# Patient Record
Sex: FEMALE | Race: ASIAN | NOT HISPANIC OR LATINO | Employment: UNEMPLOYED | URBAN - METROPOLITAN AREA
[De-identification: names, ages, dates, MRNs, and addresses within clinical notes are randomized per-mention and may not be internally consistent; named-entity substitution may affect disease eponyms.]

---

## 2019-05-04 PROBLEM — E11.9 TYPE 2 DIABETES MELLITUS WITHOUT COMPLICATION, WITHOUT LONG-TERM CURRENT USE OF INSULIN (HCC): Status: ACTIVE | Noted: 2019-05-04

## 2019-05-04 PROBLEM — E03.8 OTHER SPECIFIED HYPOTHYROIDISM: Status: ACTIVE | Noted: 2019-05-04

## 2019-07-15 PROBLEM — E78.1 HIGH TRIGLYCERIDES: Status: ACTIVE | Noted: 2019-07-15

## 2019-07-15 PROBLEM — M17.0 BILATERAL PRIMARY OSTEOARTHRITIS OF KNEE: Status: ACTIVE | Noted: 2019-07-15

## 2020-06-25 PROBLEM — R60.0 PEDAL EDEMA: Status: ACTIVE | Noted: 2020-06-25

## 2021-01-13 PROBLEM — M54.42 ACUTE LEFT-SIDED LOW BACK PAIN WITH LEFT-SIDED SCIATICA: Status: ACTIVE | Noted: 2021-01-13

## 2021-01-13 PROBLEM — I10 ESSENTIAL HYPERTENSION: Status: ACTIVE | Noted: 2021-01-13

## 2021-01-13 PROBLEM — M25.561 ACUTE PAIN OF RIGHT KNEE: Status: ACTIVE | Noted: 2021-01-13

## 2021-08-03 ENCOUNTER — HOSPITAL ENCOUNTER (EMERGENCY)
Age: 66
Discharge: HOME | End: 2021-08-03
Attending: EMERGENCY MEDICINE

## 2021-08-03 VITALS
DIASTOLIC BLOOD PRESSURE: 63 MMHG | RESPIRATION RATE: 18 BRPM | TEMPERATURE: 97.8 F | SYSTOLIC BLOOD PRESSURE: 105 MMHG | WEIGHT: 127.65 LBS | OXYGEN SATURATION: 97 % | HEART RATE: 70 BPM

## 2021-08-03 DIAGNOSIS — U07.1 COVID-19 VIRUS INFECTION: Primary | ICD-10-CM

## 2021-08-03 PROCEDURE — 10002800 HB RX 250 W HCPCS: Performed by: EMERGENCY MEDICINE

## 2021-08-03 PROCEDURE — M0243 CASIRIVI AND IMDEVI INFUSION: HCPCS

## 2021-08-03 PROCEDURE — 99282 EMERGENCY DEPT VISIT SF MDM: CPT

## 2021-08-03 PROCEDURE — 10002807 HB RX 258: Performed by: EMERGENCY MEDICINE

## 2021-08-03 RX ORDER — 0.9 % SODIUM CHLORIDE 0.9 %
2 VIAL (ML) INJECTION EVERY 12 HOURS SCHEDULED
Status: DISCONTINUED | OUTPATIENT
Start: 2021-08-03 | End: 2021-08-03 | Stop reason: HOSPADM

## 2021-08-03 RX ADMIN — IMDEVIMAB 600 MG: 1332 INJECTION, SOLUTION, CONCENTRATE INTRAVENOUS at 11:38

## 2021-08-03 ASSESSMENT — ENCOUNTER SYMPTOMS
BACK PAIN: 0
ADENOPATHY: 0
COUGH: 0
SORE THROAT: 0
SHORTNESS OF BREATH: 0
FATIGUE: 1
VOMITING: 0
NAUSEA: 0
LIGHT-HEADEDNESS: 0
CHILLS: 0
DIZZINESS: 0
ABDOMINAL PAIN: 0
FEVER: 0
DIARRHEA: 0

## 2021-08-03 ASSESSMENT — PAIN SCALES - GENERAL: PAINLEVEL_OUTOF10: 0

## 2022-02-17 ENCOUNTER — TELEPHONE (OUTPATIENT)
Dept: ENDOCRINOLOGY | Age: 67
End: 2022-02-17

## 2024-12-06 RX ORDER — TIRZEPATIDE 2.5 MG/.5ML
INJECTION, SOLUTION SUBCUTANEOUS
COMMUNITY

## 2024-12-18 NOTE — H&P
[facility]  Orthopedic Surgery    Patient: Dewey Alvarado  Medical Record Number: [id number]      HPI   Dewey Alvarado is a 68 year old female with ongoing left knee pain and disability. She has failed conservative management strategies      REVIEW OF SYSTEMS   GENERAL HEALTH: Denies fevers, chills, recent weight loss, unremitting night pain, difficulty sleeping, fatigue   SKIN: Denies history of skin rashes, easy bruising, swollen ankles   EYES: Denies blurred vision, double vision, or changes in vision   HEENT: Denies swallowing or hearing difficulties   RESPIRATORY: Denies shortness of breath  CARDIOVASCULAR: Denies chest pain or palpitations   GI: Denies nausea, vomiting, heartburn, constipation, diarrhea, rectal bleeding, melena  : Denies dysuria, urgency, frequency, hematuria, increase in night urination, inability to completely empty bladder   NEURO: Denies balance problems, seizures, increased clumsiness, dropping things, tremor, headaches/ migraines   PSYCH: Denies depression, no anxiety.   HEMATOLOGY: Denies history of bruising, excessive bleeding, or anemia  ENDOCRINE: Denies excessive thirst or urination, unexpected weight changes  MUSCULOSKELETAL: As per HPI  ALLERGY/IMM.: Denies food or seasonal allergies     PMH     Past Medical History:    Cataract    Diabetes (HCC)    High blood pressure    High cholesterol    Hypothyroidism    Obesity    Osteoarthritis    Visual impairment       Current Outpatient Medications   Medication Sig Dispense Refill    Tirzepatide (MOUNJARO) 2.5 MG/0.5ML Subcutaneous Solution Auto-injector Inject into the skin.      TRAMADOL HCL OR Take by mouth every evening.      HYDROCHLOROTHIAZIDE OR Take by mouth as needed (SWELLING).      METFORMIN HCL OR Take by mouth daily.      Ergocalciferol (VITAMIN D OR) Take by mouth daily. Vitamin D OTC.      rosuvastatin 5 MG Oral Tab Take 1 tablet (5 mg total) by mouth nightly. 90 tablet 0    DICLOFENAC SODIUM 1 % External  Gel APPLY 2 G TOPICALLY 4 (FOUR) TIMES DAILY. 100 g 0    Omega-3-acid Ethyl Esters 1 g Oral Cap Take 4 capsules (4 g total) by mouth daily. 360 capsule 0    SYNTHROID 25 MCG Oral Tab Take 1 tablet (25 mcg total) by mouth before breakfast. 90 tablet 1    losartan 100 MG Oral Tab Take 1 tablet (100 mg total) by mouth daily. 90 tablet 3    guaiFENesin-codeine 100-10 MG/5ML Oral Solution Take 5-10 mL by mouth every 6 (six) hours as needed for cough. 150 mL 0    benzonatate 200 MG Oral Cap Take 1 capsule (200 mg total) by mouth 3 (three) times daily as needed for cough. 20 capsule 0       Allergies[1]   Past Surgical History:   Procedure Laterality Date    Colonoscopy      Hysterectomy      Upper gi endoscopy,exam         Social History     Socioeconomic History    Marital status:    Tobacco Use    Smoking status: Never    Smokeless tobacco: Never   Vaping Use    Vaping status: Never Used   Substance and Sexual Activity    Alcohol use: Never    Drug use: Never        EXAM   Ht 5' (1.524 m)   Wt 176 lb (79.8 kg)   BMI 34.37 kg/m²     General: alert and oriented x 3, well developed, well nourished and in no apparent distress  Head/neck: Normocephalic, atraumatic  Eyes: EOMI  Skin: Skin intact. No lacerations, abrasions  Cardiac: RRR. S1 S2. No murmurs, rubs, gallops  Pulmonary: Normal respirations. CTA bilaterally  MSK: left knee with 10-15 degree valgus deformity. 15 degree lack of extension with flexion to 95. 1+ effusion, 1+ pitting edema. Palpable osteophytes  Vascular: Pulses intact with good perfusion  Neurologic: Sensation intact. Strength 5/5  Psychiatric: Normal mood and affect. Cooperative    IMAGING   Left knee x-rays show severe degenerative arthritis with collapse/bone on bone. osteopenia      ASSESSMENT AND PLAN   Left knee osteoarthritis    Discussion & Treatment Plan:  Left total knee arthroplasty. Risks and benefits of the procedure were discussed. All questions answered.  Patient understands and  agrees with all aspects of the above plan.    Dr. Rell Plascencia  H&P prepared by Carlee PEDRAZA         [1]   Allergies  Allergen Reactions    Drug Ingredient [Triamcinolone] OTHER (SEE COMMENTS)     Patient had knee injections by ortho that caused facial flushing.

## 2025-01-06 ENCOUNTER — ANESTHESIA EVENT (OUTPATIENT)
Dept: SURGERY | Facility: HOSPITAL | Age: 70
End: 2025-01-06
Payer: MEDICARE

## 2025-01-06 NOTE — DISCHARGE INSTRUCTIONS
Sometimes managing your health at home requires assistance.   The [facility] team has recognized your preference to use [facility].  They can be reached by phone at [phone].  The fax number for your reference is [phone].  A representative from the home health agency will contact you or your family to schedule your first visit.    CPM machine through [facility] [phone]    Knee Replacement Discharge Instructions    Dear Patient,     [facility] cares about your progress with recovery following your joint replacement surgery.     300 days from your scheduled surgery, [facility] will send you a follow-up survey to help us understand how your surgery impacted your mobility, pain, and overall quality of life. Please make every effort to complete this survey. The information collected from this survey will be used by your physician to track your recovery.     Sincerely,     [facility] [place]      Activity    Bathing  No tub baths, pools, or saunas until cleared by surgeon (about 4-6 weeks because it takes that long for the incision on the skin to heal and be a barrier to prevent infection).  When allowed to shower:    IF AQUACEL - dressing is waterproof and does not require being covered to keep it dry.  Pat dressing and surrounding skin dry after shower              AQUACEL          Gauze dressings are NOT waterproof and REQUIRE being covered with a waterproof barrier to keep the dressing and the incision dry.  SARAN WRAP, GLAD WRAP, and PRESS N SEAL WORK  REALLY WELL BUT ANY PLASTIC WRAP WILL DO.   Do not wash incision.   Remove entire wrapping and old dressing (if Gauze) after showering. Pat dry if necessary WITH A CLEAN TOWEL and cover incision with dry sterile gauze and paper tape. For other types of dressings, follow surgeon’s orders.                                 GAUZE          Driving  Do not drive until cleared by surgeon.  This is usually in four to six weeks after surgery. Discuss at follow-up office visit.   Not allowed while taking narcotic pain medication or muscle relaxants.    Sex  Usually allowed after four to six weeks - check with surgeon at your office visit.    Return to work  Usually allowed after four to six weeks. Discuss specific work activities with your surgeon.    Restrictions  For knee replacement surgery, follow instructions provided by physical therapy.  Do NOT put a pillow under your knee as it may be more difficult to straighten afterwards.    No smoking  Avoid smoking. It is known to cause breathing problems and can decrease the rate of healing.    Incision care/Dressing changes  Wash hands before and after dressing changes.  FOR DRY GAUZE DRESSING:  Change dressing daily using dry sterile gauze and paper tape once Aquacel (waterproof) dressing changed (which is about 7 days after surgery). Continue this until you follow up in the office with your surgeon. Have knee bent when changing dressing for more ease of bending afterwards.  There could be a small amount of redness around the staples or incision; this is normal.  Watch for increased redness, warmth, any odor, increased drainage or opening of the incision. A little clear yellow or blood tinged drainage is normal up to 2 weeks after surgery but it should be less every day until it stops.  Call physician if you notice any concerning changes.  Sutures/staples will be removed at first office visit (10 days- 3 weeks).                          GAUZE                                                   Medication  Anticoagulants = blood thinners (Xarelto, Eliquis, Lovenox, Coumadin, or Aspirin)  Pill or shot form depending on what your physician orders.   IF placed on Coumadin, you may also need lab work done for monitoring.  You will bleed easier and bruise easier while on these medications.   Usually you will be on a blood thinner for about 2-5 weeks depending  what physician orders.  Contact your physician if you have signs of bruising, nose bleeds or blood in your urine. You may consider using electric razors and soft bristle toothbrushes.  Do not take aspirin while taking blood thinners unless ordered by your physician.  Review anticoagulant education information sheet provided.    Discomfort  Surgical discomfort is normal for one to two months.  Have realistic goals and keep a positive outlook.  You may need pain medication regularly (every 4-6 hours) the first 2 weeks and then begin to decrease how often you are taking it.  Take pain medication as prescribed with food, especially before therapy, allowing 30-60 minutes to take effect.  Do not drink alcohol while on pain medication.  Keep pain manageable; pain should not disrupt your sleep or activities like getting out of bed or walking.  As you have less discomfort, decrease the amount of pain medication you take. Use plain Tylenol (acetaminophen) for less severe pain.  Some pain medications have Tylenol (acetaminophen) in them such as Norco and Percocet. Do NOT take Tylenol (acetaminophen) within 4 hours of a dose of these medications.  Apply ice or cold therapy to surgical site for 20 minutes at least four times a day, especially after therapy. Be sure there is a thin cloth barrier between skin and ice or cold therapy.  Change position at least every 45 minutes while awake to avoid stiffness or increased discomfort.  For knee replacements- may elevate your leg by placing a pillow under entire leg. Do not place pillow only under the knee.  Have realistic goals and keep a positive outlook.  Deep breathing and relaxation techniques and distractions can help!  If you focus on something else, you do not experience the pain the same. Take advantage of everything available to your to help control you discomfort.  Contact physician if discomfort does not respond to pain medication.    Body changes  Constipation is common  with the use of narcotics.  Eat fiber rich foods and drink plenty of fluids.  Use stool softeners such as Colace or Senakot while on narcotics, and laxatives such as Miralax or Milk of Magnesia if needed.   An enema or suppository may be needed if above measures do not work.    Prevention of infection and promotion of healing  Good hand washing is important. Everyone should wash their hands or use hand  as soon as they walk in your house-whether they live there or are visiting.  Keep bed linen/clothing freshly laundered.  Do not allow others or pets to touch your incision.  Avoid people that have colds or the flu.  Your surgeon may recommend that you take antibiotics before you undergo any dental or other invasive surgical procedures after your joint replacement. Speak with your physician about this at your post-op office visit.  Eat a balanced diet high in fiber and drink plenty of fluids.   Continue using incentive spirometry because narcotics make you sleepy so you may not take good deep breaths. We do not want you to get pneumonia.     Post op Office visits  Schedule 10 days to 3 weeks after surgery WITH SURGEON’s office.  Additional visits may need to be scheduled. Your physician will discuss this at first post-op office visit.  Schedule outpatient physical therapy per your surgeon’s orders.  Schedule one week follow up after surgery WITH PRIMARY CARE PHYSICIAN; review your medications over last 6 months. Your body gets stressed by surgery and that stress can affect all your other health issues (such as high blood pressure, diabetes, CHF, afib, and asthma just to name a few).  We don’t want those other health issues to cause you to get readmitted to the hospital; much better for you to catch developing problems and prevent them from becoming larger ones.  NICOLE HOSE - IF ordered by your surgeon, wear these during the day and off at night.      Notify your surgeon if you notice any  of the following  signs  Separation of incision line.  Increased redness, swelling, or warmth of skin around incision.  Increased or foul smelling drainage from incision  Red streaks on skin near incision.  Temperature >101 F.  Increased pain at incision not relieved by pain medication.      Signs of blood clot  Pain, excessive tenderness, redness, or swelling in leg or calf (other than incision site).  (CALL SURGEON)      Go directly to the ER or CALL 911 if  you:  become short of breath  have chest pain  cough up blood  have unexplained anxiety with breathing  Traveling and Handicapped parking  Check with you surgeon when allowed so you don’t set yourself up for greater chance of complications.  If traveling by car, get out to stretch every 2 hours.  This helps prevent stiffness.  You may need to do this any time you travel for the first year after surgery.  If traveling by plane, BEFORE you get into a security line, let them know that you had your hip replaced, as you will most likely set off the metal detector. The doctors no longer provide an identification card for this as they are easily copied. ALSO request a wheelchair the first year to board and get off a plane…this aids in priority seating and you should sit on the aisle or at the bulkhead where you can easily stretch your legs and get up to walk up and down the aisles…this helps prevent blood clots and stiffness.  TEMPORARY HANDICAP PARKING APPLICATION  (good for 3-6 months)  - At Surgeon or PCP visit, request they fill out the form, then go to DM (only time you do not wait in a long line there). Some [facility] offices provide the same service. ([facility] [place] and [place] have this service; if you live in another [facility], you may check with them as well). You need space to open car doors to position yourself properly with walker to get in and out of your car safely; some parking spaces are  practically on top of each other and do not give you enough  room.    SPECIAL  INSTRUCTIONS:  Spanda- knee replacement (single layer compression sleeve):  All patients should wear the compression sleeves (SPANDA-) until first follow up visit to surgeon’s office ( about 2-3 weeks) and then check with surgeon if need to continue use.  Take off to shower. Best to keep on as much as possible, even at night.  Wash with mild soap and water; DRIP dry overnight.    Directions to put on and off:  IT TAKES 2 PIECES OF SPANDA- (compression sleeves), (USUALLY DIFFERENT SIZES because a calf is usually smaller than a knee.)   Use the longer tube (spanda-/compression sleeve) pulled up over the calf.   This 1st piece (spanda-/compression sleeve) should be on the calf part of the leg, from just below the knee to the ball of the foot to expose the toes.   The 2nd piece (shorter compression sleeve) is pulled over and past the first sleeve onto the knee. The lower edge of the knee portion should overlap the top of the calf spanda- by a few inches. This is the only place where the 2 different pieces overlap.  The top edge of the second spanda- should be about a few inches above the knee but it should NOT be rolling down. The spanda- should be flat so that it does not roll and become too tight.  Makes sure it is NOT bunched up anywhere.   IF the spanda- feels TOO tight, then REMOVE it and call your surgeon to let them know.

## 2025-01-07 ENCOUNTER — APPOINTMENT (OUTPATIENT)
Dept: GENERAL RADIOLOGY | Facility: HOSPITAL | Age: 70
End: 2025-01-07
Attending: ORTHOPAEDIC SURGERY
Payer: MEDICARE

## 2025-01-07 ENCOUNTER — HOSPITAL ENCOUNTER (OUTPATIENT)
Facility: HOSPITAL | Age: 70
Discharge: HOME | End: 2025-01-08
Attending: ORTHOPAEDIC SURGERY | Admitting: ORTHOPAEDIC SURGERY
Payer: MEDICARE

## 2025-01-07 ENCOUNTER — ANESTHESIA (OUTPATIENT)
Dept: SURGERY | Facility: HOSPITAL | Age: 70
End: 2025-01-07
Payer: MEDICARE

## 2025-01-07 DIAGNOSIS — M17.12 PRIMARY OSTEOARTHRITIS OF LEFT KNEE: Primary | Chronic | ICD-10-CM

## 2025-01-07 DIAGNOSIS — I10 ESSENTIAL HYPERTENSION: ICD-10-CM

## 2025-01-07 LAB
CREAT BLD-MCNC: 0.71 MG/DL
EGFRCR SERPLBLD CKD-EPI 2021: 92 ML/MIN/1.73M2 (ref 60–?)
EST. AVERAGE GLUCOSE BLD GHB EST-MCNC: 123 MG/DL (ref 68–126)
GLUCOSE BLD-MCNC: 111 MG/DL (ref 70–99)
GLUCOSE BLD-MCNC: 125 MG/DL (ref 70–99)
GLUCOSE BLD-MCNC: 148 MG/DL (ref 70–99)
HBA1C MFR BLD: 5.9 % (ref ?–5.7)

## 2025-01-07 PROCEDURE — 82962 GLUCOSE BLOOD TEST: CPT

## 2025-01-07 PROCEDURE — 76942 ECHO GUIDE FOR BIOPSY: CPT | Performed by: STUDENT IN AN ORGANIZED HEALTH CARE EDUCATION/TRAINING PROGRAM

## 2025-01-07 PROCEDURE — 83036 HEMOGLOBIN GLYCOSYLATED A1C: CPT | Performed by: HOSPITALIST

## 2025-01-07 PROCEDURE — 88311 DECALCIFY TISSUE: CPT | Performed by: ORTHOPAEDIC SURGERY

## 2025-01-07 PROCEDURE — 97161 PT EVAL LOW COMPLEX 20 MIN: CPT

## 2025-01-07 PROCEDURE — 0SRD0J9 REPLACEMENT OF LEFT KNEE JOINT WITH SYNTHETIC SUBSTITUTE, CEMENTED, OPEN APPROACH: ICD-10-PCS | Performed by: ORTHOPAEDIC SURGERY

## 2025-01-07 PROCEDURE — 88305 TISSUE EXAM BY PATHOLOGIST: CPT | Performed by: ORTHOPAEDIC SURGERY

## 2025-01-07 PROCEDURE — 73560 X-RAY EXAM OF KNEE 1 OR 2: CPT | Performed by: ORTHOPAEDIC SURGERY

## 2025-01-07 PROCEDURE — 82565 ASSAY OF CREATININE: CPT | Performed by: ORTHOPAEDIC SURGERY

## 2025-01-07 PROCEDURE — 97116 GAIT TRAINING THERAPY: CPT

## 2025-01-07 PROCEDURE — 3E0T3BZ INTRODUCTION OF ANESTHETIC AGENT INTO PERIPHERAL NERVES AND PLEXI, PERCUTANEOUS APPROACH: ICD-10-PCS | Performed by: ORTHOPAEDIC SURGERY

## 2025-01-07 DEVICE — IMPLANTABLE DEVICE
Type: IMPLANTABLE DEVICE | Site: KNEE | Status: FUNCTIONAL
Brand: REFOBACIN® BONE CEMENT R

## 2025-01-07 DEVICE — IMPLANTABLE DEVICE
Type: IMPLANTABLE DEVICE | Site: KNEE | Status: FUNCTIONAL
Brand: PERSONA®

## 2025-01-07 DEVICE — IMPLANTABLE DEVICE
Type: IMPLANTABLE DEVICE | Site: KNEE | Status: FUNCTIONAL
Brand: PERSONA® NATURAL TIBIA®

## 2025-01-07 DEVICE — IMPLANTABLE DEVICE
Type: IMPLANTABLE DEVICE | Site: KNEE | Status: FUNCTIONAL
Brand: PERSONA® VIVACIT-E®

## 2025-01-07 RX ORDER — LABETALOL HYDROCHLORIDE 5 MG/ML
5 INJECTION, SOLUTION INTRAVENOUS EVERY 5 MIN PRN
Status: DISCONTINUED | OUTPATIENT
Start: 2025-01-07 | End: 2025-01-07 | Stop reason: HOSPADM

## 2025-01-07 RX ORDER — ROSUVASTATIN CALCIUM 10 MG/1
10 TABLET, COATED ORAL NIGHTLY
Status: DISCONTINUED | OUTPATIENT
Start: 2025-01-07 | End: 2025-01-08

## 2025-01-07 RX ORDER — OXYCODONE HYDROCHLORIDE 5 MG/1
5 TABLET ORAL EVERY 4 HOURS PRN
Status: DISCONTINUED | OUTPATIENT
Start: 2025-01-07 | End: 2025-01-07

## 2025-01-07 RX ORDER — DEXTROSE MONOHYDRATE 25 G/50ML
50 INJECTION, SOLUTION INTRAVENOUS
Status: DISCONTINUED | OUTPATIENT
Start: 2025-01-07 | End: 2025-01-07 | Stop reason: HOSPADM

## 2025-01-07 RX ORDER — NICOTINE POLACRILEX 4 MG
30 LOZENGE BUCCAL
Status: DISCONTINUED | OUTPATIENT
Start: 2025-01-07 | End: 2025-01-07 | Stop reason: HOSPADM

## 2025-01-07 RX ORDER — TRANEXAMIC ACID 10 MG/ML
INJECTION, SOLUTION INTRAVENOUS AS NEEDED
Status: DISCONTINUED | OUTPATIENT
Start: 2025-01-07 | End: 2025-01-07 | Stop reason: SURG

## 2025-01-07 RX ORDER — ACETAMINOPHEN 500 MG
1000 TABLET ORAL ONCE AS NEEDED
Status: COMPLETED | OUTPATIENT
Start: 2025-01-07 | End: 2025-01-07

## 2025-01-07 RX ORDER — ACETAMINOPHEN 500 MG
1000 TABLET ORAL ONCE
Status: DISCONTINUED | OUTPATIENT
Start: 2025-01-07 | End: 2025-01-07 | Stop reason: HOSPADM

## 2025-01-07 RX ORDER — HYDROMORPHONE HYDROCHLORIDE 1 MG/ML
0.6 INJECTION, SOLUTION INTRAMUSCULAR; INTRAVENOUS; SUBCUTANEOUS EVERY 5 MIN PRN
Status: DISCONTINUED | OUTPATIENT
Start: 2025-01-07 | End: 2025-01-07 | Stop reason: HOSPADM

## 2025-01-07 RX ORDER — ACETAMINOPHEN 500 MG
1000 TABLET ORAL EVERY 8 HOURS SCHEDULED
Status: DISCONTINUED | OUTPATIENT
Start: 2025-01-07 | End: 2025-01-07

## 2025-01-07 RX ORDER — ACETAMINOPHEN 500 MG
1000 TABLET ORAL EVERY 8 HOURS SCHEDULED
Status: DISCONTINUED | OUTPATIENT
Start: 2025-01-07 | End: 2025-01-08

## 2025-01-07 RX ORDER — DIPHENHYDRAMINE HYDROCHLORIDE 50 MG/ML
12.5 INJECTION INTRAMUSCULAR; INTRAVENOUS EVERY 4 HOURS PRN
Status: DISCONTINUED | OUTPATIENT
Start: 2025-01-07 | End: 2025-01-08

## 2025-01-07 RX ORDER — LEVOTHYROXINE SODIUM 25 UG/1
25 TABLET ORAL
Status: DISCONTINUED | OUTPATIENT
Start: 2025-01-08 | End: 2025-01-08

## 2025-01-07 RX ORDER — NICOTINE POLACRILEX 4 MG
15 LOZENGE BUCCAL
Status: DISCONTINUED | OUTPATIENT
Start: 2025-01-07 | End: 2025-01-07 | Stop reason: HOSPADM

## 2025-01-07 RX ORDER — FERROUS SULFATE 325(65) MG
325 TABLET, DELAYED RELEASE (ENTERIC COATED) ORAL
Status: DISCONTINUED | OUTPATIENT
Start: 2025-01-08 | End: 2025-01-08

## 2025-01-07 RX ORDER — SODIUM PHOSPHATE, DIBASIC AND SODIUM PHOSPHATE, MONOBASIC 7; 19 G/230ML; G/230ML
1 ENEMA RECTAL ONCE AS NEEDED
Status: DISCONTINUED | OUTPATIENT
Start: 2025-01-07 | End: 2025-01-08

## 2025-01-07 RX ORDER — ASPIRIN 81 MG/1
81 TABLET ORAL 2 TIMES DAILY
Status: DISCONTINUED | OUTPATIENT
Start: 2025-01-07 | End: 2025-01-08

## 2025-01-07 RX ORDER — METOCLOPRAMIDE HYDROCHLORIDE 5 MG/ML
5 INJECTION INTRAMUSCULAR; INTRAVENOUS EVERY 8 HOURS PRN
Status: DISCONTINUED | OUTPATIENT
Start: 2025-01-07 | End: 2025-01-08

## 2025-01-07 RX ORDER — KETOROLAC TROMETHAMINE 15 MG/ML
15 INJECTION, SOLUTION INTRAMUSCULAR; INTRAVENOUS EVERY 6 HOURS PRN
Status: DISCONTINUED | OUTPATIENT
Start: 2025-01-07 | End: 2025-01-08

## 2025-01-07 RX ORDER — SODIUM CHLORIDE, SODIUM LACTATE, POTASSIUM CHLORIDE, CALCIUM CHLORIDE 600; 310; 30; 20 MG/100ML; MG/100ML; MG/100ML; MG/100ML
INJECTION, SOLUTION INTRAVENOUS CONTINUOUS
Status: DISCONTINUED | OUTPATIENT
Start: 2025-01-07 | End: 2025-01-07

## 2025-01-07 RX ORDER — HYDROMORPHONE HYDROCHLORIDE 1 MG/ML
0.2 INJECTION, SOLUTION INTRAMUSCULAR; INTRAVENOUS; SUBCUTANEOUS EVERY 2 HOUR PRN
Status: DISCONTINUED | OUTPATIENT
Start: 2025-01-07 | End: 2025-01-07

## 2025-01-07 RX ORDER — TRAMADOL HYDROCHLORIDE 50 MG/1
50 TABLET ORAL EVERY 6 HOURS SCHEDULED
Status: DISCONTINUED | OUTPATIENT
Start: 2025-01-07 | End: 2025-01-07

## 2025-01-07 RX ORDER — DEXAMETHASONE SODIUM PHOSPHATE 10 MG/ML
8 INJECTION, SOLUTION INTRAMUSCULAR; INTRAVENOUS ONCE
Status: COMPLETED | OUTPATIENT
Start: 2025-01-08 | End: 2025-01-08

## 2025-01-07 RX ORDER — HYDROMORPHONE HYDROCHLORIDE 1 MG/ML
0.2 INJECTION, SOLUTION INTRAMUSCULAR; INTRAVENOUS; SUBCUTANEOUS EVERY 5 MIN PRN
Status: DISCONTINUED | OUTPATIENT
Start: 2025-01-07 | End: 2025-01-07 | Stop reason: HOSPADM

## 2025-01-07 RX ORDER — CYCLOBENZAPRINE HCL 10 MG
10 TABLET ORAL EVERY 8 HOURS PRN
Status: DISCONTINUED | OUTPATIENT
Start: 2025-01-07 | End: 2025-01-08

## 2025-01-07 RX ORDER — DOCUSATE SODIUM 100 MG/1
100 CAPSULE, LIQUID FILLED ORAL 2 TIMES DAILY
Status: DISCONTINUED | OUTPATIENT
Start: 2025-01-07 | End: 2025-01-08

## 2025-01-07 RX ORDER — LOSARTAN POTASSIUM 50 MG/1
TABLET ORAL DAILY
COMMUNITY

## 2025-01-07 RX ORDER — HYDROCODONE BITARTRATE AND ACETAMINOPHEN 5; 325 MG/1; MG/1
2 TABLET ORAL ONCE AS NEEDED
Status: COMPLETED | OUTPATIENT
Start: 2025-01-07 | End: 2025-01-07

## 2025-01-07 RX ORDER — PHENYLEPHRINE HCL 10 MG/ML
VIAL (ML) INJECTION AS NEEDED
Status: DISCONTINUED | OUTPATIENT
Start: 2025-01-07 | End: 2025-01-07 | Stop reason: SURG

## 2025-01-07 RX ORDER — HYDROCODONE BITARTRATE AND ACETAMINOPHEN 5; 325 MG/1; MG/1
TABLET ORAL
Status: COMPLETED
Start: 2025-01-07 | End: 2025-01-07

## 2025-01-07 RX ORDER — NICOTINE POLACRILEX 4 MG
30 LOZENGE BUCCAL
Status: DISCONTINUED | OUTPATIENT
Start: 2025-01-07 | End: 2025-01-08

## 2025-01-07 RX ORDER — NICOTINE POLACRILEX 4 MG
15 LOZENGE BUCCAL
Status: DISCONTINUED | OUTPATIENT
Start: 2025-01-07 | End: 2025-01-08

## 2025-01-07 RX ORDER — TRAMADOL HYDROCHLORIDE 50 MG/1
50 TABLET ORAL EVERY 6 HOURS SCHEDULED
Status: DISCONTINUED | OUTPATIENT
Start: 2025-01-07 | End: 2025-01-08

## 2025-01-07 RX ORDER — OXYCODONE HYDROCHLORIDE 5 MG/1
5 TABLET ORAL EVERY 4 HOURS PRN
Status: DISCONTINUED | OUTPATIENT
Start: 2025-01-07 | End: 2025-01-08

## 2025-01-07 RX ORDER — NALOXONE HYDROCHLORIDE 0.4 MG/ML
0.08 INJECTION, SOLUTION INTRAMUSCULAR; INTRAVENOUS; SUBCUTANEOUS AS NEEDED
Status: DISCONTINUED | OUTPATIENT
Start: 2025-01-07 | End: 2025-01-07 | Stop reason: HOSPADM

## 2025-01-07 RX ORDER — METOCLOPRAMIDE HYDROCHLORIDE 5 MG/ML
10 INJECTION INTRAMUSCULAR; INTRAVENOUS EVERY 8 HOURS PRN
Status: DISCONTINUED | OUTPATIENT
Start: 2025-01-07 | End: 2025-01-07

## 2025-01-07 RX ORDER — DEXTROSE MONOHYDRATE 25 G/50ML
50 INJECTION, SOLUTION INTRAVENOUS
Status: DISCONTINUED | OUTPATIENT
Start: 2025-01-07 | End: 2025-01-08

## 2025-01-07 RX ORDER — HYDROMORPHONE HYDROCHLORIDE 1 MG/ML
0.4 INJECTION, SOLUTION INTRAMUSCULAR; INTRAVENOUS; SUBCUTANEOUS EVERY 2 HOUR PRN
Status: DISCONTINUED | OUTPATIENT
Start: 2025-01-07 | End: 2025-01-08

## 2025-01-07 RX ORDER — MIDAZOLAM HYDROCHLORIDE 1 MG/ML
INJECTION INTRAMUSCULAR; INTRAVENOUS AS NEEDED
Status: DISCONTINUED | OUTPATIENT
Start: 2025-01-07 | End: 2025-01-07 | Stop reason: SURG

## 2025-01-07 RX ORDER — HYDROMORPHONE HYDROCHLORIDE 1 MG/ML
0.2 INJECTION, SOLUTION INTRAMUSCULAR; INTRAVENOUS; SUBCUTANEOUS EVERY 2 HOUR PRN
Status: DISCONTINUED | OUTPATIENT
Start: 2025-01-07 | End: 2025-01-08

## 2025-01-07 RX ORDER — HYDROCODONE BITARTRATE AND ACETAMINOPHEN 5; 325 MG/1; MG/1
1 TABLET ORAL ONCE AS NEEDED
Status: COMPLETED | OUTPATIENT
Start: 2025-01-07 | End: 2025-01-07

## 2025-01-07 RX ORDER — HYDROMORPHONE HYDROCHLORIDE 1 MG/ML
0.4 INJECTION, SOLUTION INTRAMUSCULAR; INTRAVENOUS; SUBCUTANEOUS EVERY 5 MIN PRN
Status: DISCONTINUED | OUTPATIENT
Start: 2025-01-07 | End: 2025-01-07 | Stop reason: HOSPADM

## 2025-01-07 RX ORDER — POLYETHYLENE GLYCOL 3350 17 G/17G
17 POWDER, FOR SOLUTION ORAL DAILY PRN
Status: DISCONTINUED | OUTPATIENT
Start: 2025-01-07 | End: 2025-01-08

## 2025-01-07 RX ORDER — HYDROMORPHONE HYDROCHLORIDE 1 MG/ML
0.4 INJECTION, SOLUTION INTRAMUSCULAR; INTRAVENOUS; SUBCUTANEOUS EVERY 2 HOUR PRN
Status: DISCONTINUED | OUTPATIENT
Start: 2025-01-07 | End: 2025-01-07

## 2025-01-07 RX ORDER — ONDANSETRON 2 MG/ML
4 INJECTION INTRAMUSCULAR; INTRAVENOUS EVERY 6 HOURS PRN
Status: DISCONTINUED | OUTPATIENT
Start: 2025-01-07 | End: 2025-01-08

## 2025-01-07 RX ORDER — ROSUVASTATIN CALCIUM 10 MG/1
10 TABLET, COATED ORAL NIGHTLY
COMMUNITY

## 2025-01-07 RX ORDER — DIPHENHYDRAMINE HYDROCHLORIDE 50 MG/ML
25 INJECTION INTRAMUSCULAR; INTRAVENOUS ONCE AS NEEDED
Status: ACTIVE | OUTPATIENT
Start: 2025-01-07 | End: 2025-01-07

## 2025-01-07 RX ORDER — TRANEXAMIC ACID 10 MG/ML
1000 INJECTION, SOLUTION INTRAVENOUS ONCE
Status: COMPLETED | OUTPATIENT
Start: 2025-01-07 | End: 2025-01-07

## 2025-01-07 RX ORDER — DIPHENHYDRAMINE HCL 25 MG
25 CAPSULE ORAL EVERY 4 HOURS PRN
Status: DISCONTINUED | OUTPATIENT
Start: 2025-01-07 | End: 2025-01-08

## 2025-01-07 RX ORDER — TRANEXAMIC ACID 10 MG/ML
INJECTION, SOLUTION INTRAVENOUS
Status: COMPLETED
Start: 2025-01-07 | End: 2025-01-07

## 2025-01-07 RX ORDER — SENNOSIDES 8.6 MG
17.2 TABLET ORAL NIGHTLY
Status: DISCONTINUED | OUTPATIENT
Start: 2025-01-07 | End: 2025-01-08

## 2025-01-07 RX ORDER — SODIUM CHLORIDE, SODIUM LACTATE, POTASSIUM CHLORIDE, CALCIUM CHLORIDE 600; 310; 30; 20 MG/100ML; MG/100ML; MG/100ML; MG/100ML
INJECTION, SOLUTION INTRAVENOUS CONTINUOUS
Status: DISCONTINUED | OUTPATIENT
Start: 2025-01-07 | End: 2025-01-07 | Stop reason: HOSPADM

## 2025-01-07 RX ORDER — LOSARTAN POTASSIUM 50 MG/1
50 TABLET ORAL DAILY
Status: DISCONTINUED | OUTPATIENT
Start: 2025-01-07 | End: 2025-01-08

## 2025-01-07 RX ORDER — BISACODYL 10 MG
10 SUPPOSITORY, RECTAL RECTAL
Status: DISCONTINUED | OUTPATIENT
Start: 2025-01-07 | End: 2025-01-08

## 2025-01-07 RX ADMIN — SODIUM CHLORIDE, SODIUM LACTATE, POTASSIUM CHLORIDE, CALCIUM CHLORIDE: 600; 310; 30; 20 INJECTION, SOLUTION INTRAVENOUS at 09:35:00

## 2025-01-07 RX ADMIN — SODIUM CHLORIDE, SODIUM LACTATE, POTASSIUM CHLORIDE, CALCIUM CHLORIDE: 600; 310; 30; 20 INJECTION, SOLUTION INTRAVENOUS at 10:26:00

## 2025-01-07 RX ADMIN — TRANEXAMIC ACID 1000 MG: 10 INJECTION, SOLUTION INTRAVENOUS at 08:44:00

## 2025-01-07 RX ADMIN — MIDAZOLAM HYDROCHLORIDE 2 MG: 1 INJECTION INTRAMUSCULAR; INTRAVENOUS at 08:43:00

## 2025-01-07 RX ADMIN — PHENYLEPHRINE HCL 100 MCG: 10 MG/ML VIAL (ML) INJECTION at 08:45:00

## 2025-01-07 RX ADMIN — SODIUM CHLORIDE, SODIUM LACTATE, POTASSIUM CHLORIDE, CALCIUM CHLORIDE: 600; 310; 30; 20 INJECTION, SOLUTION INTRAVENOUS at 08:19:00

## 2025-01-07 NOTE — BRIEF OP NOTE
Pre-Operative Diagnosis: OSTEOARTHRITIS LEFT KNEE     Post-Operative Diagnosis: OSTEOARTHRITIS LEFT KNEE      Procedure Performed:   LEFT TOTAL KNEE ARTHROPLASTY    Surgeons and Role:     * Rell Plascencia MD - Primary    Assistant(s):  PA: Carlee Waller PA  APN: Last Ca APN     Surgical Findings: above     Specimen: bone     Estimated Blood Loss: Blood Output: 20 mL (1/7/2025  9:46 AM)        Rell Plascencia MD  1/7/2025  9:52 AM

## 2025-01-07 NOTE — PHYSICAL THERAPY NOTE
PHYSICAL THERAPY EVALUATION - INPATIENT     Room Number: 376/376-A  Evaluation Date: 1/7/2025  Type of Evaluation: Initial  Physician Order: PT Eval and Treat    Presenting Problem: L TKA  Co-Morbidities : DM2, hypothyroidism, HTN  Reason for Therapy: Mobility Dysfunction and Discharge Planning    PHYSICAL THERAPY ASSESSMENT   Patient is a 69 year old female admitted 1/7/2025 for L TKA.  Prior to admission, patient's baseline is independent.  Patient is currently functioning near baseline with bed mobility, transfers, and gait.  Patient is requiring contact guard assist as a result of the following impairments: decreased functional strength, decreased endurance/aerobic capacity, pain, impaired static and dynamic balance, impaired motor planning, decreased muscular endurance, and medical status.  Physical Therapy will continue to follow for duration of hospitalization.    Patient will benefit from continued skilled PT Services at discharge to promote prior level of function and safety with additional support and return home with home health PT.    PLAN DURING HOSPITALIZATION  Nursing Mobility Recommendation : 1 Assist  PT Device Recommendation: Rolling walker  PT Treatment Plan: Bed mobility;Body mechanics;Endurance;Energy conservation;Patient education;Family education;Gait training;Transfer training;Balance training;Strengthening;Stair training  Rehab Potential : Fair  Frequency (Obs): Daily     CURRENT GOALS    Goal #1 Patient is able to demonstrate supine - sit EOB @ level: independent     Goal #2 Patient is able to demonstrate transfers EOB to/from Chair/Wheelchair at assistance level: supervision     Goal #3 Patient is able to ambulate 150 feet with assist device: walker - rolling at assistance level: supervision     Goal #4 Pt able to ascend and descend 4 stairs with railing and supervision   Goal #5    Goal #6    Goal Comments: Goals established on 1/7/2025      PHYSICAL THERAPY MEDICAL/SOCIAL  HISTORY  History related to current admission: Patient is a 69 year old female admitted on 1/7/2025 from home for L TKA 1/7.      HOME SITUATION  Type of Home: House  Home Layout: Multi-level;Stairlift  Stairs to Enter : 4   Railing: Yes              Lives With:  (son, DIL, 5 grandkids)    Drives: No   Patient Regularly Uses: Reading glasses      Prior Level of [place]: Pt typically indep with ADLs and mobility. Lives with family who are able to help as needed.     SUBJECTIVE  \"Can I use the bathroom?\"    OBJECTIVE  Precautions: Bed/chair alarm  Fall Risk: High fall risk    WEIGHT BEARING RESTRICTION  L Lower Extremity: Weight Bearing as Tolerated    PAIN ASSESSMENT  Rating:  (tightness)          COGNITION  Overall Cognitive Status:  WFL - within functional limits    RANGE OF MOTION AND STRENGTH ASSESSMENT    Lower extremity ROM is within functional limits except for the following:  L knee s/p TKA    Lower extremity strength is within functional limits except for the following:   L knee s/p TKA, no buckling during session but difficulty obtaining full knee extension     BALANCE  Static Sitting: Fair +  Dynamic Sitting: Fair +  Static Standing: Poor +  Dynamic Standing: Poor +    ADDITIONAL TESTS                                    ACTIVITY TOLERANCE                         O2 WALK  Oxygen Therapy  SPO2% on Room Air at Rest: 98    NEUROLOGICAL FINDINGS                        AM-PAC '6-Clicks' INPATIENT SHORT FORM - BASIC MOBILITY  How much difficulty does the patient currently have...  Patient Difficulty: Turning over in bed (including adjusting bedclothes, sheets and blankets)?: A Little   Patient Difficulty: Sitting down on and standing up from a chair with arms (e.g., wheelchair, bedside commode, etc.): A Little   Patient Difficulty: Moving from lying on back to sitting on the side of the bed?: A Little   How much help from another person does the patient currently need...   Help from Another: Moving to and  from a bed to a chair (including a wheelchair)?: A Little   Help from Another: Need to walk in hospital room?: A Little   Help from Another: Climbing 3-5 steps with a railing?: A Little     AM-PAC Score:  Raw Score: 18   Approx Degree of Impairment: 46.58%   Standardized Score (AM-PAC Scale): 43.63   CMS Modifier (G-Code): CK    FUNCTIONAL ABILITY STATUS  Gait Assessment   Functional Mobility/Gait Assessment  Gait Assistance: Minimum assistance;Contact guard assist  Distance (ft): 10, 10  Assistive Device: Rolling walker  Pattern: L Decreased stance time    Skilled Therapy Provided     Bed Mobility:  Rolling: NT  Supine to sit: supervision   Sit to supine: NT     Transfer Mobility:  Sit to stand: CGA   Stand to sit: CGA  Gait = Giancarlo progressing to supervision    Therapist's Comments: RN cleared for session. Pt agreeable for therapy, received supine. Pt trained on proper UE/LE placement during STS transfer. Pt trained on gait sequencing with RW for optimal support of LLE and pain mgmt. Pt with BM, transferred to bathroom with cues for usage of wall rail. Assisted with pericare, pt with water bottle to rinse marley-area. Instructed to call for nursing staff for any needs and OOB mobility.     Exercise/Education Provided:  Bed mobility  Body mechanics  Energy conservation  Functional activity tolerated  Gait training  Posture  Strengthening  Transfer training    Patient End of Session: Up in chair;Needs met;Call light within reach;RN aware of session/findings;All patient questions and concerns addressed;Hospital anti-slip socks;Alarm set;Family present      Patient Evaluation Complexity Level:  History High - 3 or more personal factors and/or co-morbidities   Examination of body systems Low -  addressing 1-2 elements   Clinical Presentation Low- Stable   Clinical Decision Making Low Complexity       PT Session Time: 30 minutes  Gait Training: 10 minutes  Therapeutic Activity: 5 minutes

## 2025-01-07 NOTE — ANESTHESIA PROCEDURE NOTES
Spinal Block    Date/Time: 1/7/2025 8:27 AM    Performed by: Samson Chisholm MD  Authorized by: Samson Chisholm MD      General Information and Staff    Start Time:  1/7/2025 8:27 AM  End Time:  1/7/2025 8:30 AM  Anesthesiologist:  Samson Chisholm MD  Performed by:  Anesthesiologist  Site identification: surface landmarks    Preanesthetic Checklist: patient identified, IV checked, risks and benefits discussed, monitors and equipment checked, pre-op evaluation, timeout performed, anesthesia consent and sterile technique used      Procedure Details    Patient Position:  Sitting  Prep: ChloraPrep    Monitoring:  Cardiac monitor, heart rate and continuous pulse ox  Approach:  Midline  Location:  L3-4  Injection Technique:  Single-shot    Needle    Needle Type:  Sprotte  Needle Gauge:  24 G  Needle Length:  3.5 in    Assessment    Sensory Level:   Events: clear CSF, CSF aspirated, well tolerated and blood negative      Additional Comments

## 2025-01-07 NOTE — CONSULTS
[facility]   part of [facility]    Medical Consult     Dewey Alvarado Patient Status:  Outpatient in a Bed    1955 MRN [id number]   Location [facility] 3SW-A Attending Rell Plascencia MD   Hosp Day # 0 PCP Adriana Mac MD     Chief Complaint: Osteoarthritis left knee    History of Present Illness: Dewey Alvarado is a 69 year old female with history of diabetes, hypertension, hyperlipidemia, hypothyroidism, osteoarthritis presenting with osteoarthritis left knee status post left total knee arthroplasty.  Patient denies any preoperative positive review of systems.  Patient denies any acute issues.  Patient's pain is controlled.    Past Medical History:  Past Medical History:    Cataract    Diabetes (HCC)    High blood pressure    High cholesterol    Hypothyroidism    Obesity    Osteoarthritis    Visual impairment        Past Surgical History:   Past Surgical History:   Procedure Laterality Date    Colonoscopy      Hysterectomy      Upper gi endoscopy,exam         Social History:  reports that she has never smoked. She has never used smokeless tobacco. She reports that she does not drink alcohol and does not use drugs.No illegal drugs, , 7 children, no cane or walker, live with children    Family History:   Family History   Problem Relation Age of Onset    Heart Disorder Father     Diabetes Father     Diabetes Mother     Heart Disorder Mother     Diabetes Sister     Diabetes Brother    Mother passed  Father passed    Allergies: Allergies[1]    Medications:  Medications Ordered Prior to Encounter[2]    Review of Systems:   A comprehensive 14 point review of systems was completed.    Pertinent positives and negatives noted in the HPI.    Physical Exam:    BP (!) 164/60 (BP Location: Left arm)   Pulse 92   Temp 99.2 °F (37.3 °C) (Oral)   Resp 22   Ht 5' (1.524 m)   Wt 176 lb (79.8 kg)   SpO2 98%   BMI 34.37 kg/m²   General: No acute distress. Alert and oriented   HEENT:  Normocephalic atraumatic. Moist mucous membranes. EOM-I.   Neck: No JVD. No carotid bruits.  Respiratory: Clear to auscultation bilaterally. No wheezes. No crackles  Cardiovascular: S1, S2. Regular rate and rhythm. No murmurs  Chest and Back: No tenderness or deformity.  Abdomen: Soft, nontender, nondistended.  Positive bowel sounds. No rebound, guarding  Neurologic: No focal neurological deficits. CNII-XII grossly intact. Sensation and strength intact  Musculoskeletal: Moves all extremities.  Extremities: No edema or tenderness of the LE  Integument: No new rashes or lesions.   Psychiatric: Appropriate mood and affect.      Diagnostic Data:      Labs:  No results for input(s): \"WBC\", \"HGB\", \"MCV\", \"PLT\", \"BAND\", \"INR\" in the last 168 hours.    Invalid input(s): \"LYM#\", \"MONO#\", \"BASOS#\", \"EOSIN#\"    No results for input(s): \"GLU\", \"BUN\", \"CREATSERUM\", \"GFRAA\", \"GFRNAA\", \"CA\", \"ALB\", \"NA\", \"K\", \"CL\", \"CO2\", \"ALKPHO\", \"AST\", \"ALT\", \"BILT\", \"TP\" in the last 168 hours.    CrCl cannot be calculated (Patient's most recent lab result is older than the maximum 7 days allowed.).    No results for input(s): \"PTP\", \"INR\" in the last 168 hours.    No results for input(s): \"TROP\", \"CK\" in the last 168 hours.    Imaging: Imaging data reviewed in Epic.      ASSESSMENT / PLAN:   69 year old female with history of diabetes, hypertension, hyperlipidemia, hypothyroidism, osteoarthritis presenting with osteoarthritis left knee status post left total knee arthroplasty.     Osteoarthritis left knee   -POD # 0 status post left total knee arthroplasty.   -ortho following  -PT/OT    Post Operative Pain  -acetaminophen po atc  -decadron iv x 1  -tramadol po atc  -hydromorphone iv prn  -toradol vi prn  -oxy po prn  -flexeril po prn  -tizanidine po prn     Type 2 DM  -hold home meds  -low dose insulin sliding scale    HTN  -SBP Stable  -losartan    HLD  -rosuvastatin     Hypothyroidism  -levothyroxine    Quality:  DVT Prophylaxis: scd, asa  bid  CODE status:   Eva:     Plan of care discussed with patient and staff    Dispo: no discharge    Cruz Vance MD  [facility] Hospitalist  [phone]                           [1]   Allergies  Allergen Reactions    Drug Ingredient [Triamcinolone] OTHER (SEE COMMENTS)     Patient had knee injections by ortho that caused facial flushing.    [2]   No current facility-administered medications on file prior to encounter.     Current Outpatient Medications on File Prior to Encounter   Medication Sig Dispense Refill    MELOXICAM OR Take by mouth daily.      Tirzepatide (MOUNJARO) 2.5 MG/0.5ML Subcutaneous Solution Auto-injector Inject into the skin.      TRAMADOL HCL OR Take by mouth every evening.      HYDROCHLOROTHIAZIDE OR Take by mouth as needed (SWELLING).      METFORMIN HCL OR Take by mouth daily.      Ergocalciferol (VITAMIN D OR) Take by mouth daily. Vitamin D OTC.      rosuvastatin 5 MG Oral Tab Take 1 tablet (5 mg total) by mouth nightly. 90 tablet 0    DICLOFENAC SODIUM 1 % External Gel APPLY 2 G TOPICALLY 4 (FOUR) TIMES DAILY. 100 g 0    Omega-3-acid Ethyl Esters 1 g Oral Cap Take 4 capsules (4 g total) by mouth daily. 360 capsule 0    SYNTHROID 25 MCG Oral Tab Take 1 tablet (25 mcg total) by mouth before breakfast. 90 tablet 1    losartan 100 MG Oral Tab Take 1 tablet (100 mg total) by mouth daily. 90 tablet 3    guaiFENesin-codeine 100-10 MG/5ML Oral Solution Take 5-10 mL by mouth every 6 (six) hours as needed for cough. 150 mL 0    benzonatate 200 MG Oral Cap Take 1 capsule (200 mg total) by mouth 3 (three) times daily as needed for cough. 20 capsule 0

## 2025-01-07 NOTE — ANESTHESIA POSTPROCEDURE EVALUATION
[facility] [place]:  Hospital Outpatient Surgery   Age/Gender 69 year old female MRN [id number]   Location [facility] SURGERY Attending Rell Plascencia MD   Hosp Day # 0 PCP Adriana Mac MD       Anesthesia Post-op Note    LEFT TOTAL KNEE ARTHROPLASTY    Procedure Summary       Date: 01/07/25 Room / Location:  MAIN OR 12 / [facility] MAIN OR    Anesthesia Start: 0819 Anesthesia Stop: 1026    Procedure: LEFT TOTAL KNEE ARTHROPLASTY (Left: Knee) Diagnosis: (OSTEOARTHRITIS LEFT KNEE)    Surgeons: Rell Plascencia MD Anesthesiologist: Samson Chisholm MD    Anesthesia Type: MAC, regional, spinal ASA Status: 3            Anesthesia Type: MAC, regional, spinal    Vitals Value Taken Time   /92 01/07/25 1025   Temp 98 01/07/25 1026   Pulse 96 01/07/25 1026   Resp 24 01/07/25 1026   SpO2 100 % 01/07/25 1026   Vitals shown include unfiled device data.        Patient Location: PACU    Anesthesia Type: regional and spinal    Airway Patency: patent    Postop Pain Control: adequate    Mental Status: preanesthetic baseline    Nausea/Vomiting: none    Cardiopulmonary/Hydration status: stable euvolemic    Complications: no apparent anesthesia related complications    Postop vital signs: stable    Dental Exam: Unchanged from Preop    Patient to be discharged from PACU when criteria met.

## 2025-01-07 NOTE — ANESTHESIA PREPROCEDURE EVALUATION
PRE-OP EVALUATION    Patient Name: Dewey Alvarado    Admit Diagnosis: OSTEOARTHRITIS LEFT KNEE    Pre-op Diagnosis: OSTEOARTHRITIS LEFT KNEE    LEFT TOTAL KNEE ARTHROPLASTY    Anesthesia Procedure: LEFT TOTAL KNEE ARTHROPLASTY (Left: Knee)    Surgeons and Role:     * Rell Plascencia MD - Primary    Pre-op vitals reviewed.  Temp: 98 °F (36.7 °C)  Pulse: 60  Resp: 16  BP: 162/57  SpO2: 100 %  Body mass index is 34.37 kg/m².    Current medications reviewed.  Hospital Medications:   acetaminophen (Tylenol Extra Strength) tab 1,000 mg  1,000 mg Oral Once    glucose (Dex4) 15 GM/59ML oral liquid 15 g  15 g Oral Q15 Min PRN    Or    glucose (Glutose) 40% oral gel 15 g  15 g Oral Q15 Min PRN    Or    glucose-vitamin C (Dex-4) chewable tab 4 tablet  4 tablet Oral Q15 Min PRN    Or    dextrose 50% injection 50 mL  50 mL Intravenous Q15 Min PRN    Or    glucose (Dex4) 15 GM/59ML oral liquid 30 g  30 g Oral Q15 Min PRN    Or    glucose (Glutose) 40% oral gel 30 g  30 g Oral Q15 Min PRN    Or    glucose-vitamin C (Dex-4) chewable tab 8 tablet  8 tablet Oral Q15 Min PRN    lactated ringers infusion   Intravenous Continuous    ceFAZolin (Ancef) 2g in 10mL IV syringe premix  2 g Intravenous Once    ceFAZolin (Ancef) 2 g/10mL IV syringe premix        clonidine/epinephrine/ropivacaine/ketorolac in 0.9% NaCl 60 mL pain cocktail syringe for knee arthroplasty   Infiltration Once (Intra-Op)       Outpatient Medications:   Prescriptions Prior to Admission[1]    Allergies: Drug ingredient [triamcinolone]      Anesthesia Evaluation    Patient summary reviewed.    Anesthetic Complications           GI/Hepatic/Renal                                 Cardiovascular        Exercise tolerance: good     MET: >4      (+) hypertension     (-) CAD  (-) past MI                              Endo/Other      (+) diabetes and well controlled, type 2, not using insulin  (+) hypothyroidism                       Pulmonary                            Neuro/Psych  Comment: Lumbar radiculopathy               (+) neuromuscular disease                     Past Surgical History:   Procedure Laterality Date    Colonoscopy      Hysterectomy      Upper gi endoscopy,exam       Social History     Socioeconomic History    Marital status:    Tobacco Use    Smoking status: Never    Smokeless tobacco: Never   Vaping Use    Vaping status: Never Used   Substance and Sexual Activity    Alcohol use: Never    Drug use: Never     History   Drug Use Unknown     Available pre-op labs reviewed.               Airway      Mallampati: IV  Mouth opening: 3 FB  TM distance: > 6 cm  Neck ROM: limited Cardiovascular    Cardiovascular exam normal.         Dental    Dentition appears grossly intact         Pulmonary    Pulmonary exam normal.                 Other findings              ASA: 3   Plan: MAC, regional and spinal  NPO status verified and           Plan/risks discussed with: patient and child/children                Present on Admission:  **None**             [1]   Medications Prior to Admission   Medication Sig Dispense Refill Last Dose/Taking    MELOXICAM OR Take by mouth daily.   1/1/2025    Tirzepatide (MOUNJARO) 2.5 MG/0.5ML Subcutaneous Solution Auto-injector Inject into the skin.   12/23/2024    TRAMADOL HCL OR Take by mouth every evening.   1/5/2025    HYDROCHLOROTHIAZIDE OR Take by mouth as needed (SWELLING).   12/23/2024    METFORMIN HCL OR Take by mouth daily.   1/6/2025 Evening    Ergocalciferol (VITAMIN D OR) Take by mouth daily. Vitamin D OTC.   12/25/2024    rosuvastatin 5 MG Oral Tab Take 1 tablet (5 mg total) by mouth nightly. 90 tablet 0 1/6/2025    DICLOFENAC SODIUM 1 % External Gel APPLY 2 G TOPICALLY 4 (FOUR) TIMES DAILY. 100 g 0 1/5/2025    Omega-3-acid Ethyl Esters 1 g Oral Cap Take 4 capsules (4 g total) by mouth daily. 360 capsule 0 12/24/2024    SYNTHROID 25 MCG Oral Tab Take 1 tablet (25 mcg total) by mouth before breakfast. 90 tablet 1 1/6/2025  Morning    losartan 100 MG Oral Tab Take 1 tablet (100 mg total) by mouth daily. 90 tablet 3 1/6/2025    guaiFENesin-codeine 100-10 MG/5ML Oral Solution Take 5-10 mL by mouth every 6 (six) hours as needed for cough. 150 mL 0 Unknown    benzonatate 200 MG Oral Cap Take 1 capsule (200 mg total) by mouth 3 (three) times daily as needed for cough. 20 capsule 0 Unknown

## 2025-01-07 NOTE — OPERATIVE REPORT
[facility] Operative Note      Patient Name: Dewey Alvarado       [id number]    Preoperative Diagnosis:   Severe valgus osteoarthritis left knee    Postoperative Diagnosis:  Same    Procedures:  Left total knee replacement with Mariama persona implants and PSI instrumentation.  Size 3 narrow PS femur, size C tibia with 14 x 30 mm stem extension, 12 CPS poly, 32 mm patella.    Primary Surgeon: Rell Plascencia MD     Assistant: Last Ca FNP-BC.  His assistance is instrumental to the appropriate and safe carrying out of the surgical procedure.    Anesthesia: Spinal, adductor canal block and PK I    Findings: Above    Operative Summary: Taken to the op room, placed on the operating table in supine position.  Balanced anesthetic is carried out.  High thigh tourniquet is applied, elevated without compression.  Sterile prep and drape, antibiotic and timeout.  Midline incision followed by median parapatellar arthrotomy, deep medial collateral ligament is cautiously released.  Fat pad is debulked.  Advanced arthritic changes seen, most profound laterally with large areas of eburnated bone.  Preoperative PSI valgus is 11.5 degrees.  PSI femurs placed distal resection follows, 2 mm of additional resection are deemed necessary.  Femur is sized to a C femoral component, anterior posterior and chamfer cuts follow.  ACL PCL are released.  PSI tibia is placed proximal resection follows.  Soft tissue balance is pursued.  There is some medial side laxity.  With preoperative valgus, age, size and release is CPS component is chosen.  Tibia sized to a C, rotation is checked and double checked femurs position notch is resected 10 and then a 12 CPS poly is placed.  Patellar thickness is 19 mm, after resection and resurfacing with a 32 mm patella patellar thickness is recreated.  Patellar tracking is good.  Final tibial and femoral preparation follows.  Tibia is assembled with stem.  Copious irrigation is washed.  40 cc of PK  I are injected posterior to the posterior capsule after cautious aspiration, remaining 20 cc were injected around the periosteum at the completion of the case.  Cement is mixed.  In a sequential fashion the tibia femur and patella are cemented in position.  Excess cement is removed.  Polys are placed.  Knee is placed over a bump, the arthrotomy is closed with #2 PDO Quill suture, after arthrotomy closure the knee is flexed 120 degrees with good security.  Subcutaneous tissues are closed with 2-0 Vicryl.  Skin is closed with sterile skin clips.  Sterile compressive dressing is applied.  Transferred to recovery room awake and stable.    HARISH Ca functions as a skilled first assistant for this case.  His involvement is instrumental to the safe, accurate and efficient caring out of surgical procedure.  His involvement includes intermittently handling and using surgical instruments, cameras/guides as well as manual and power instruments including saws and burs.  He appropriately positions, navigates and contributes to the efficient and safe surgical completion of the surgical procedure.  He is also intimately involved with closure/repair of deep and superficial closure of tissues.  His assistance expedites the completion of the case, cutting down on expense and decreasing probability of complication, and decreases the patient's time under general anesthetic.      Rell Plascencia MD  01/07/25  9:54 AM

## 2025-01-07 NOTE — CM/SW NOTE
01/07/25 1700   CM/SW Referral Data   Referral Source Social Work (self-referral)   Reason for Referral Discharge planning   Informant EMR;Clinical Staff Member   Discharge Needs   Anticipated D/C needs Home health care       Patient is a 68 y/o woman admitted s/p TKR.  Pt with pre-operative plan for [facility] [place].  PT recommending [facility] services at discharge.  Jillian from [facility] confirmed pt accepted for [facility] services at [place].  No other [place] needs/concerns identified at this time.  / to remain available for support and/or discharge planning.     Yolanda Mathias, [facility]  Discharge Planner  [phone]

## 2025-01-07 NOTE — ANESTHESIA PROCEDURE NOTES
Regional Block    Date/Time: 1/7/2025 8:35 AM    Performed by: Samson Chisholm MD  Authorized by: Samson Chisholm MD      General Information and Staff    Start Time:  1/7/2025 8:35 AM  End Time:  1/7/2025 8:40 AM  Anesthesiologist:  Samson Chisholm MD  Performed by:  Anesthesiologist  Patient Location:  OR    Block Placement: Pre Induction  Site Identification: real time ultrasound guided and image stored and retrievable    Block site/laterality marked before start: site marked  Reason for Block: at surgeon's request and post-op pain management    Preanesthetic Checklist: 2 patient identifers, IV checked, risks and benefits discussed, monitors and equipment checked, pre-op evaluation, timeout performed, anesthesia consent, sterile technique used, no prohibitive neurological deficits and no local skin infection at insertion site      Procedure Details    Patient Position:  Supine  Prep: ChloraPrep    Monitoring:  Cardiac monitor, continuous pulse ox, blood pressure cuff and heart rate  Block Type:  Adductor canal  Laterality:  Left  Injection Technique:  Single-shot    Needle    Needle Type:  Short-bevel and echogenic  Needle Length:  100 mm  Needle Localization:  Ultrasound guidance  Reason for Ultrasound Use: appropriate spread of the medication was noted in real time and no ultrasound evidence of intravascular and/or intraneural injection            Assessment    Injection Assessment:  Good spread noted, negative resistance, negative aspiration for heme, incremental injection and low pressure  Heart Rate Change: No    - Patient tolerated block procedure well without evidence of immediate block related complications.     Medications  1/7/2025 8:35 AM      Additional Comments    Medication:  Bupivacaine 0.25% 20mL

## 2025-01-08 VITALS
SYSTOLIC BLOOD PRESSURE: 114 MMHG | OXYGEN SATURATION: 98 % | TEMPERATURE: 99 F | HEIGHT: 60 IN | DIASTOLIC BLOOD PRESSURE: 47 MMHG | HEART RATE: 63 BPM | BODY MASS INDEX: 34.55 KG/M2 | WEIGHT: 176 LBS | RESPIRATION RATE: 18 BRPM

## 2025-01-08 LAB
GLUCOSE BLD-MCNC: 116 MG/DL (ref 70–99)
GLUCOSE BLD-MCNC: 185 MG/DL (ref 70–99)
HCT VFR BLD AUTO: 31.5 %
HGB BLD-MCNC: 9.9 G/DL

## 2025-01-08 PROCEDURE — 85018 HEMOGLOBIN: CPT | Performed by: ORTHOPAEDIC SURGERY

## 2025-01-08 PROCEDURE — 97110 THERAPEUTIC EXERCISES: CPT

## 2025-01-08 PROCEDURE — 97165 OT EVAL LOW COMPLEX 30 MIN: CPT

## 2025-01-08 PROCEDURE — 97116 GAIT TRAINING THERAPY: CPT

## 2025-01-08 PROCEDURE — 82962 GLUCOSE BLOOD TEST: CPT

## 2025-01-08 PROCEDURE — 85014 HEMATOCRIT: CPT | Performed by: ORTHOPAEDIC SURGERY

## 2025-01-08 PROCEDURE — 97535 SELF CARE MNGMENT TRAINING: CPT

## 2025-01-08 RX ORDER — HYDROCODONE BITARTRATE AND ACETAMINOPHEN 10; 325 MG/1; MG/1
1-2 TABLET ORAL EVERY 6 HOURS PRN
Status: SHIPPED | COMMUNITY
Start: 2025-01-09

## 2025-01-08 RX ORDER — LOSARTAN POTASSIUM 100 MG/1
50 TABLET ORAL DAILY
Status: SHIPPED | COMMUNITY
Start: 2025-01-08

## 2025-01-08 RX ORDER — ASPIRIN 81 MG/1
81 TABLET ORAL 2 TIMES DAILY
Status: SHIPPED | COMMUNITY
Start: 2025-01-08

## 2025-01-08 RX ORDER — PSEUDOEPHEDRINE HCL 30 MG
100 TABLET ORAL 2 TIMES DAILY
Status: SHIPPED | COMMUNITY
Start: 2025-01-08

## 2025-01-08 RX ORDER — ROSUVASTATIN CALCIUM 5 MG/1
10 TABLET, COATED ORAL NIGHTLY
Status: SHIPPED | COMMUNITY
Start: 2025-01-08

## 2025-01-08 RX ORDER — FERROUS SULFATE 325(65) MG
325 TABLET, DELAYED RELEASE (ENTERIC COATED) ORAL
Status: SHIPPED | COMMUNITY
Start: 2025-01-08

## 2025-01-08 RX ORDER — CELECOXIB 200 MG/1
200 CAPSULE ORAL DAILY
Status: SHIPPED | COMMUNITY
Start: 2025-01-08

## 2025-01-08 NOTE — OCCUPATIONAL THERAPY NOTE
OCCUPATIONAL THERAPY EVALUATION - INPATIENT    Room Number: 376/376-A  Evaluation Date: 1/8/2025     Type of Evaluation: Initial  Presenting Problem: s/p L TKA 1/7/25    Physician Order: IP Consult to Occupational Therapy  Reason for Therapy:  ADL/IADL Dysfunction and Discharge Planning    OCCUPATIONAL THERAPY ASSESSMENT   Patient is a 69 year old female admitted on 1/7/2025 with Presenting Problem: s/p L TKA 1/7/25. Co-Morbidities : DM2, hypothyroidism, HTN  Patient is currently functioning near baseline with toileting, bed mobility, transfers, and dynamic standing balance.  Prior to admission, patient's baseline is independent.  Patient met all OT goals at supervision to Mod I level.  Patient reports no further questions/concerns at this time.     Patient will benefit from continued skilled OT Services with no additional skilled services but increased support at home    Recommendations for nursing staff:   Transfers: 1-person  Toileting location: Toilet    EVALUATION SESSION:  Patient at start of session: supine    FUNCTIONAL TRANSFER ASSESSMENT  Sit to Stand: Edge of Bed; Chair  Edge of Bed: Supervision (increased time to achieve full stand)  Chair: Supervision (increased time to achieve full stand)  Toilet Transfer: Supervision (increased time to achieve full stand; standard height toilet with use of grab bar, reports similar height toilet at home with counter adjacent)    BED MOBILITY  Supine to Sit : Supervision (increased time, HOB elevated, reports adjustable bed at home)  Scooting: Supervision    BALANCE ASSESSMENT     FUNCTIONAL ADL ASSESSMENT  Eating: Modified Independent  Grooming Seated: Modified Independent  UB Dressing Seated: Modified Independent  LB Dressing Seated: Supervision (without AE)  LB Dressing Standing: Supervision  Toileting Seated: Supervision  Toileting Standing: Supervision    ACTIVITY TOLERANCE: WFL                         O2 SATURATIONS       COGNITION  Overall Cognitive Status:  WFL  - within functional limits    COGNITION ASSESSMENTS     Upper Extremity:   ROM: within functional limits   Strength: is within functional limits     EDUCATION PROVIDED  Patient Education : Role of Occupational Therapy; Functional Transfer Techniques; Fall Prevention; Weight Bear Status; Posture/Positioning  Patient's Response to Education: Verbalized Understanding    Equipment used: RW  Demonstrates functional use    Therapist comments: Patient motivated and participatory. Educated on safety precautions and incorporation into ADLs and transfers, followed with good return demo. Patient performed all ADLs and functional transfers at Supervision to Mod I level. Patient aware of recommendation for initial supervision at discharge, including supervision for shower transfers and increased assist with IADLs; patient reports will have initial supervision/assist as needed from family at discharge. Supportive daughter at bedside throughout, translating for patient as needed; declined need for ipad . Intermittent cues to daughter to allow patient to attempt tasks herself before assisting. Patient reports no further questions or concerns regarding discharge home to [facility].     Patient End of Session: Up in chair;Needs met;Call light within reach;RN aware of session/findings;With  staff;All patient questions and concerns addressed;Hospital anti-slip socks;Alarm set;Family present;Ice applied    OCCUPATIONAL PROFILE    HOME SITUATION  Type of Home: House  Home Layout: Multi-level;[employer]  Lives With:  (son, DIL, 5 grandkids)    Toilet and Equipment: Standard height toilet  Shower/Tub and Equipment: Tub-shower combo;Grab bar;Shower chair       Occupation/Status: retired     Drives: No  Patient Regularly Uses: Reading glasses    Prior Level of Function: Patient reports independent in all I/ADL and functional mobility without device prior to admission.    SUBJECTIVE  \"Pain is 5\"    PAIN ASSESSMENT  Ratin  Location:  L knee  Management Techniques: Body mechanics    OBJECTIVE  Precautions: Bed/chair alarm  Fall Risk: High fall risk    WEIGHT BEARING RESTRICTION  L Lower Extremity: Weight Bearing as Tolerated      AM-PAC ‘6-Clicks’ Inpatient Daily Activity Short Form  -   Putting on and taking off regular lower body clothing?: A Little (supervision)  -   Bathing (including washing, rinsing, drying)?: A Little (supervision)  -   Toileting, which includes using toilet, bedpan or urinal? : A Little (supervision)  -   Putting on and taking off regular upper body clothing?: None  -   Taking care of personal grooming such as brushing teeth?: None  -   Eating meals?: None    AM-PAC Score:  Score: 21  Approx Degree of Impairment: 32.79%  Standardized Score (AM-PAC Scale): 44.27    ADDITIONAL TESTS     NEUROLOGICAL FINDINGS      PLAN   Patient has been evaluated and presents with no skilled Occupational Therapy needs at this time.  Patient discharged from Occupational Therapy services.  Please re-order if a new functional limitation presents during this admission.    OT Device Recommendations: None    Patient Evaluation Complexity Level:   Occupational Profile/Medical History LOW - Brief history including review of medical or therapy records    Specific performance deficits impacting engagement in ADL/IADL LOW  1 - 3 performance deficits    Client Assessment/Performance Deficits LOW - No comorbidities nor modifications of tasks    Clinical Decision Making LOW - Analysis of occupational profile, problem-focused assessments, limited treatment options    Overall Complexity LOW     OT Session Time: 30 minutes  Self-Care Home Management: 10 minutes

## 2025-01-08 NOTE — PLAN OF CARE
A&O x4, Sami speaking only. VSS on RA. . IS - 1000. Pain controlled with PO medication. Up with min assist with gb and walker. Voids freely to bathroom. Bilateral SCDs. AW drsg to LLE C/D/I, gel ice wrap applied. IV ancef post op. PT rec HHC at [place]. Reviewed POC, pain management, IS use, and fall precautions with pt and family at bedside. Bed alarm on w/bed in lowest position. Pt reminded to use call light. Verbalized understanding. Plan to dc home with [facility] tomorrow.

## 2025-01-08 NOTE — PROGRESS NOTES
[facility] Hospitalist Progress Note                                                                     [facility]   part of [facility]  12/26/1955    SUBJECTIVE: no chest pain, palpitations, shortness of breath, cough, nausea, vomiting, abdominal pain. Patient post operative pain controlled     OBJECTIVE:  Temp:  [98.5 °F (36.9 °C)-99.4 °F (37.4 °C)] 99.4 °F (37.4 °C)  Pulse:  [62-97] 68  Resp:  [16-27] 18  BP: ()/(37-92) 128/46  SpO2:  [95 %-100 %] 97 %  Exam  Gen: No acute distress, alert and oriented   Pulm: Lungs clear bilaterally, normal respiratory effort, no crackles, no wheezing  CV: Heart with regular rate and rhythm, no murmur.  Abd: Abdomen soft, nontender, nondistended, bowel sounds present  MSK: No significant pitting edema or tenderness of the LE  Skin: no new rashes or lesions    Labs:   Recent Labs   Lab 01/08/25  0516   HGB 9.9*       Recent Labs   Lab 01/07/25  1531   CREATSERUM 0.71       No results for input(s): \"ALT\", \"AST\", \"ALB\", \"AMYLASE\", \"LIPASE\", \"LDH\" in the last 168 hours.    Invalid input(s): \"ALPHOS\", \"TBIL\", \"DBIL\", \"TPROT\"    Recent Labs   Lab 01/07/25  0634 01/07/25  1038 01/07/25  2116 01/08/25  0529   PGLU 125* 148* 111* 116*       Meds:   Scheduled:    sennosides  17.2 mg Oral Nightly    docusate sodium  100 mg Oral BID    ferrous sulfate  325 mg Oral Daily with breakfast    aspirin  81 mg Oral BID    dexAMETHasone PF  8 mg Intravenous Once    acetaminophen  1,000 mg Oral Q8H ALEXUS    traMADol  50 mg Oral 4 times per day    losartan  50 mg Oral Daily    rosuvastatin  10 mg Oral Nightly    levothyroxine  25 mcg Oral Before breakfast    insulin aspart  1-5 Units Subcutaneous TID AC and HS     Continuous Infusions:   PRN:   cyclobenzaprine    polyethylene glycol (PEG 3350)    magnesium hydroxide    bisacodyl    fleet enema    ondansetron    diphenhydrAMINE **OR** diphenhydrAMINE    tiZANidine     ketorolac    oxyCODONE **OR** oxyCODONE    HYDROmorphone **OR** HYDROmorphone    glucose **OR** glucose **OR** glucose-vitamin C **OR** dextrose **OR** glucose **OR** glucose **OR** glucose-vitamin C    metoclopramide    ASSESSMENT / PLAN:   69 year old female with history of diabetes, hypertension, hyperlipidemia, hypothyroidism, osteoarthritis presenting with osteoarthritis left knee status post left total knee arthroplasty.      Osteoarthritis left knee   -POD # 1 status post left total knee arthroplasty.   -ortho following--> ok for dc  -PT/OT--> ok for dc     Post Operative Pain  -acetaminophen po atc  -decadron iv x 1  -tramadol po atc  -hydromorphone iv prn  -toradol vi prn  -oxy po prn  -flexeril po prn  -tizanidine po prn   -pain meds on discharge per surgical service      Type 2 DM  -hold home meds, resume on dc  -low dose insulin sliding scale while inpt     HTN  -SBP Stable  -losartan     HLD  -rosuvastatin      Hypothyroidism  -levothyroxine     Quality:  DVT Prophylaxis: scd, asa bid  CODE status:   Eva:      Plan of care discussed with patient and staff     Dispo: medically stable for discharge     Cruz Vance MD  [facility] Hospitalist  [phone]

## 2025-01-08 NOTE — PHYSICAL THERAPY NOTE
PHYSICAL THERAPY TREATMENT NOTE - INPATIENT    Room Number: 376/376-A     Session: 1     Number of Visits to Meet Established Goals: 3    Presenting Problem: L TKA  Co-Morbidities : DM2, hypothyroidism, HTN    PHYSICAL THERAPY MEDICAL/SOCIAL HISTORY  History related to current admission: Patient is a 69 year old female admitted on 1/7/2025 from home for L TKA 1/7.       HOME SITUATION  Type of Home: House  Home Layout: Multi-level;Stairlift  Stairs to Enter : 4   Railing: Yes              Lives With:  (son, DIL, 5 grandkids)    Drives: No   Patient Regularly Uses: Reading glasses       Prior Level of [place]: Pt typically indep with ADLs and mobility. Lives with family who are able to help as needed.     PHYSICAL THERAPY ASSESSMENT     Patient is currently functioning near baseline with bed mobility, transfers, and gait.    Patient currently does not meet criteria for skilled inpatient physical therapy services, however patient will continue to benefit from QID ambulation with nursing staff.  Pt is discharged from IP PT services.  RN aware to re-order if there is a decline in mobility status.      Patient continues to benefit from continued skilled PT services: at discharge to promote prior level of function and safety with additional support and return home with home health PT.    PLAN DURING HOSPITALIZATION  Nursing Mobility Recommendation : 1 Assist  PT Device Recommendation: Rolling walker  PT Treatment Plan: Bed mobility;Body mechanics;Endurance;Energy conservation;Patient education;Family education;Gait training;Transfer training;Balance training;Strengthening;Stair training  Frequency (Obs): Daily     CURRENT GOALS     Goal #1 Patient is able to demonstrate supine - sit EOB @ level: independent      Goal #2 Patient is able to demonstrate transfers EOB to/from Chair/Wheelchair at assistance level: supervision      Goal #3 Patient is able to ambulate 150 feet with assist device: walker - rolling at  assistance level: supervision      Goal #4 Pt able to ascend and descend 4 stairs with railing and supervision   Goal #5     Goal #6     Goal Comments: Goals established on 2025 all goals ongoing    SUBJECTIVE  \"I am hoping to go home soon\"    OBJECTIVE  Precautions: Bed/chair alarm    WEIGHT BEARING RESTRICTION  L Lower Extremity: Weight Bearing as Tolerated    PAIN ASSESSMENT   Ratin          BALANCE                                                                                                                       Static Sitting: Fair +  Dynamic Sitting: Fair +           Static Standing: Poor +  Dynamic Standing: Poor +    ACTIVITY TOLERANCE                         O2 WALK       AM-PAC '6-Clicks' INPATIENT SHORT FORM - BASIC MOBILITY  How much difficulty does the patient currently have...  Patient Difficulty: Turning over in bed (including adjusting bedclothes, sheets and blankets)?: A Little   Patient Difficulty: Sitting down on and standing up from a chair with arms (e.g., wheelchair, bedside commode, etc.): A Little   Patient Difficulty: Moving from lying on back to sitting on the side of the bed?: A Little   How much help from another person does the patient currently need...   Help from Another: Moving to and from a bed to a chair (including a wheelchair)?: A Little   Help from Another: Need to walk in hospital room?: A Little   Help from Another: Climbing 3-5 steps with a railing?: A Little     AM-PAC Score:  Raw Score: 18   Approx Degree of Impairment: 46.58%   Standardized Score (AM-PAC Scale): 43.63   CMS Modifier (G-Code): CK    FUNCTIONAL ABILITY STATUS  Gait Assessment   Functional Mobility/Gait Assessment  Gait Assistance: Supervision  Distance (ft): 100 - 50  Assistive Device: Rolling walker  Pattern: L Decreased stance time  Stairs: Stairs;Car transfer  How Many Stairs: 4  Device: 1 Rail  Assist: Supervision  Pattern: Ascend and Descend  Ascend and Descend : Step to  Car  transfer: SBA with daughter prsent    Skilled Therapy Provided    Bed Mobility:  Rolling: NT   Supine<>Sit: NT   Sit<>Supine: NT     Transfer Mobility:  Sit<>Stand: SBA   Stand<>Sit: SBA   Gait: SBA with increased time provided    Therapist's Comments: Pt's daughter translating during session PRN. Denied concerns prior to dc      THERAPEUTIC EXERCISES  Lower Extremity Ankle pumps  Glut sets  Heel raises  Heel slides  Knee extension  Leg slides  Quad sets  SLR     Upper Extremity none     Position Sitting     Repetitions   10   Sets   1     Patient End of Session: Up in chair;Needs met;Call light within reach;RN aware of session/findings;All patient questions and concerns addressed;Hospital anti-slip socks;SCDs in place;Ice applied;Alarm set;Family present    PT Session Time: 23 minutes  Gait Trainin minutes  Therapeutic Activity: 15 minutes  Therapeutic Exercise: 0 minutes   Neuromuscular Re-education: 0 minutes

## 2025-01-08 NOTE — PLAN OF CARE
A&oriented x4 . VSS. . IS encouraged.  SCDs. Ankle pumps encouraged. Tolerating diet. . Voiding. Pain managed with scheduled pain meds.Aquacel Dressing to knee - left C/D/I. Ambulating with x1 assist. Plan is PT/OT and DC home today . Patient updated and in agreement with plan of care. Safety precautions in place. Instructed patient to call for assistance, call light within reach.

## 2025-01-08 NOTE — PLAN OF CARE
Arden A&OX4, primarily Polish speaking. Family at bedside translating. VSS on RA, . Patient reports pain relief with prescribed pain medications. Post op dressing intact, gel ice as tolerated. Plan to see PT/OT and DC with [facility].

## 2025-01-08 NOTE — PROGRESS NOTES
[facility]  Orthopedic Surgery  Progress Note    Dewey Alvarado Patient Status:  Outpatient in a Bed    1955 MRN [id number]   [facility] 3SW-A Attending Rell Plascencia MD   Hosp Day # 0 PCP Adriana Mac MD     SUBJECTIVE:  INTERVAL HISTORY: S/P  0  Procedure(s):  LEFT TOTAL KNEE ARTHROPLASTY Left .  Patient has no current complaints. Patient denies chest pain and shortness of breath.    OBJECTIVE:  Patient Vitals for the past 24 hrs:   BP Temp Temp src Pulse Resp SpO2   25 0401 111/37 98.6 °F (37 °C) Oral 62 18 97 %   25 2318 129/56 98.6 °F (37 °C) Oral 70 20 95 %   25 1929 146/70 99 °F (37.2 °C) Oral 70 20 98 %   25 1900 137/51 -- -- 75 -- 98 %   25 1616 143/44 99.3 °F (37.4 °C) Oral 77 20 100 %   25 1532 (!) 164/60 99.2 °F (37.3 °C) Oral 92 22 98 %   25 1500 150/69 -- -- -- -- 97 %   25 1450 124/78 -- -- 87 -- 97 %   25 1430 -- -- -- -- -- 98 %   25 1426 -- -- -- -- -- 98 %   25 1400 138/76 -- -- 84 18 100 %   25 1330 133/68 -- -- 90 21 99 %   25 1300 134/65 -- -- 82 21 98 %   25 1230 96/84 -- -- 85 21 98 %   25 1200 151/63 -- -- 96 23 98 %   25 1130 143/64 -- -- 86 16 98 %   25 1115 155/64 -- -- 83 16 99 %   25 1100 147/62 -- -- 86 23 98 %   25 1045 152/73 -- -- 83 21 99 %   25 1040 136/82 -- -- 88 25 100 %   25 1035 152/70 -- -- 89 (!) 27 100 %   25 1030 148/70 -- -- 97 21 100 %   25 1025 (!) 123/92 98.5 °F (36.9 °C) Temporal 95 22 99 %       ORTHO EXAM:  Left lower extremity:  Neurovascular intact, reaonable quad contraction.  Thigh and calf soft,  non-tender, negative Joseph's test.  Aquacel dressing intact with less than 50% saturation.  No erythema present.       PAIN: mild/moderate controlled well with IV/oral medication.      LABORATORY:  Recent Labs   Lab 25  0516   HGB 9.9*   HCT 31.5*      No results for input(s): \"PTP\",  \"INR\" in the last 168 hours.      ASSESSMENT/PLAN:  Continue pain management  Continue DVT prophylaxis   Continue PT/OT  Discharge planning: HOME WITH HOME HEALTH .  Continue medical management  Follow up in office with Rell Plascencia MD in 2 weeks      Rell Plascencia MD  1/8/2025  6:52 AM

## (undated) DEVICE — WRAP COMPR UNIV KNEE HOT CLD GEL MICWV AND

## (undated) DEVICE — 3 BONE CEMENT MIXER: Brand: MIXEVAC

## (undated) DEVICE — BNDG,ELSTC,MATRIX,STRL,4"X5YD,LF,HOOK&LP: Brand: MEDLINE

## (undated) DEVICE — NEEDLE SPNL 18GA L3.5IN PNK QNCKE STYL DISP

## (undated) DEVICE — NEPTUNE E-SEP SMOKE EVACUATION PENCIL, COATED, 70MM BLADE, PUSH BUTTON SWITCH: Brand: NEPTUNE E-SEP

## (undated) DEVICE — PAD,ABDOMINAL,8"X7.5",ST,LF,20/BX: Brand: MEDLINE INDUSTRIES, INC.

## (undated) DEVICE — HOOD: Brand: FLYTE

## (undated) DEVICE — PIN DRL 75MM HDLSS TRCR NXGN

## (undated) DEVICE — RECIPROCATING BLADE, DOUBLE SIDED, OFFSET  (70.0 X 1.0 X 12.5MM)

## (undated) DEVICE — GLOVE SUR 8 SENSICARE PI PIP GRN PWD F

## (undated) DEVICE — 450 ML BOTTLE OF 0.05% CHLORHEXIDINE GLUCONATE IN 99.95% STERILE WATER FOR IRRIGATION, USP AND APPLICATOR.: Brand: IRRISEPT ANTIMICROBIAL WOUND LAVAGE

## (undated) DEVICE — SOLUTION IRRIG 1000ML 0.9% NACL USP BTL

## (undated) DEVICE — HOOD, PEEL-AWAY: Brand: FLYTE

## (undated) DEVICE — UNIVERSAL STERIBUMP® STERILE (5/CASE): Brand: UNIVERSAL STERIBUMP®

## (undated) DEVICE — 3M™ STERI-DRAPE™ INSTRUMENT POUCH 1018: Brand: STERI-DRAPE™

## (undated) DEVICE — SCREW ORTH 2.5X25MM FEM HEX PERSONA

## (undated) DEVICE — SUT COAT VCRL + 2-0 27IN ABSRB UD ANTIBACT PSL

## (undated) DEVICE — APPLICATOR PREP 26ML CHG 2% ISO ALC 70%

## (undated) DEVICE — COVER,LIGHT,CAMERA,HARD,1/PK,STRL: Brand: MEDLINE

## (undated) DEVICE — GLOVE SUR 7.5 SENSICARE PI PIP CRM PWD F

## (undated) DEVICE — GOWN,SIRUS,FABRIC-REINFORCED,X-LARGE: Brand: MEDLINE

## (undated) DEVICE — GLOVE SUR 6 SENSICARE PI PIP CRM PWD F

## (undated) DEVICE — GUIDEPIN SUR L39MM FEM TIB PREFERRED PT SPEC

## (undated) DEVICE — DISPOSABLE TOURNIQUET CUFF SINGLE BLADDER, DUAL PORT AND QUICK CONNECT CONNECTOR: Brand: COLOR CUFF

## (undated) DEVICE — STRYKER PERFORMANCE SERIES SAGITTAL BLADE: Brand: STRYKER PERFORMANCE SERIES

## (undated) DEVICE — BANDAGE,GAUZE,BULKEE II,4.5"X4.1YD,STRL: Brand: MEDLINE

## (undated) DEVICE — DUAL CUT SAGITTAL BLADE

## (undated) DEVICE — CONTAINER,SPECIMEN,PNEU TUBE,4OZ,OR STRL: Brand: MEDLINE

## (undated) DEVICE — SLEEVE COMPR MD KNEE LEN SGL USE KENDALL SCD

## (undated) DEVICE — 2T11 #2 PDO 36 X 36: Brand: 2T11 #2 PDO 36 X 36

## (undated) DEVICE — TOTAL KNEE CDS: Brand: MEDLINE INDUSTRIES, INC.

## (undated) DEVICE — SYRINGE MED 30ML STD CLR PLAS LL TIP N CTRL

## (undated) DEVICE — STOCKINETTE ORTH 8X72IN 2 PLY TBLR SEWN END

## (undated) DEVICE — BNDG,ELSTC,MATRIX,STRL,6"X5YD,LF,HOOK&LP: Brand: MEDLINE

## (undated) DEVICE — GLOVE SUR 6 SENSICARE PI PIP GRN PWD F

## (undated) NOTE — LETTER
OUTSIDE TESTING RESULT REQUEST     IMPORTANT: FOR YOUR IMMEDIATE ATTENTION  Please FAX all test results listed below to: [phone]     Testing already done on or about: Appointment 2024 @ 0930     * * * * If testing is NOT complete, arrange with patient A.S.A.P. * * * *      Patient Name: Dewey Alvarado  Surgery Date: 2025  Medical Record: [id number]  CSN: [id number]  : 1955 - A: 69 y     Sex: female  Surgeon(s):  Rell Plascencia MD  Procedure: LEFT TOTAL KNEE ARTHROPLASTY  Anesthesia Type: Choice     Surgeon: Rell Plascencia MD     The following Testing and Time Line are REQUIRED PER ANESTHESIA     EKG READ AND SIGNED WITHIN   90 days      Thank You,   Sent by: Andree GILL

## (undated) NOTE — LETTER
OUTSIDE TESTING RESULT REQUEST     IMPORTANT: FOR YOUR IMMEDIATE ATTENTION  Please FAX all test results listed below to: [phone]       * * * * If testing is NOT complete, arrange with patient A.S.A.P. * * * *      Patient Name: Dewey Alvarado  Surgery Date: 2025  Medical Record: [id number]  CSN: [id number]  : 1955 - A: 69 y     Sex: female  Surgeon(s):  Rell Plascencia MD  Procedure: LEFT TOTAL KNEE ARTHROPLASTY  Anesthesia Type: Choice     Surgeon: Rell Plascencia MD     The following Testing and Time Line are REQUIRED PER ANESTHESIA     EKG READ AND SIGNED WITHIN   90 days  CBC, Platelet; NO Differential within  90 days  BMP (requires 4 hour fast) within  90 days  MSSA/MRSA Nasal screening within 30 days      Thank You,   Sent by: BRAEDEN MOREL

## (undated) NOTE — LETTER
OUTSIDE TESTING RESULT REQUEST     IMPORTANT: FOR YOUR IMMEDIATE ATTENTION  Please FAX all test results listed below to: [phone]     Testing already done on or about:      * * * * If testing is NOT complete, arrange with patient A.S.A.P. * * * *      Patient Name: Dewey Alvarado  Surgery Date: 2025  Medical Record: [id number]  CSN: [id number]  : 1955 - A: 69 y     Sex: female  Surgeon(s):  Rell Plascencia MD  Procedure: LEFT TOTAL KNEE ARTHROPLASTY  Anesthesia Type: Choice     Surgeon: Rell Plascencia MD     The following Testing and Time Line are REQUIRED PER ANESTHESIA     EKG READ AND SIGNED WITHIN   90 days      Thank You,   Sent by: Andree GILL